# Patient Record
Sex: MALE | Race: WHITE | NOT HISPANIC OR LATINO | URBAN - METROPOLITAN AREA
[De-identification: names, ages, dates, MRNs, and addresses within clinical notes are randomized per-mention and may not be internally consistent; named-entity substitution may affect disease eponyms.]

---
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
## 2024-08-28 ENCOUNTER — OFFICE VISIT (OUTPATIENT)
Dept: URGENT CARE | Facility: PHYSICIAN GROUP | Age: 19
End: 2024-08-28
Payer: COMMERCIAL

## 2024-08-28 VITALS
TEMPERATURE: 97 F | SYSTOLIC BLOOD PRESSURE: 120 MMHG | BODY MASS INDEX: 19.1 KG/M2 | HEART RATE: 63 BPM | WEIGHT: 121.7 LBS | OXYGEN SATURATION: 98 % | DIASTOLIC BLOOD PRESSURE: 84 MMHG | RESPIRATION RATE: 14 BRPM | HEIGHT: 67 IN

## 2024-08-28 DIAGNOSIS — J02.9 PHARYNGITIS, UNSPECIFIED ETIOLOGY: ICD-10-CM

## 2024-08-28 DIAGNOSIS — J06.9 VIRAL UPPER RESPIRATORY TRACT INFECTION: ICD-10-CM

## 2024-08-28 LAB
FLUAV RNA SPEC QL NAA+PROBE: NEGATIVE
FLUBV RNA SPEC QL NAA+PROBE: NEGATIVE
RSV RNA SPEC QL NAA+PROBE: NEGATIVE
S PYO DNA SPEC NAA+PROBE: NOT DETECTED
SARS-COV-2 RNA RESP QL NAA+PROBE: NEGATIVE

## 2024-08-28 RX ORDER — CETIRIZINE HYDROCHLORIDE 10 MG/1
10 TABLET ORAL DAILY
COMMUNITY

## 2024-08-28 RX ORDER — DEXAMETHASONE SODIUM PHOSPHATE 10 MG/ML
10 INJECTION INTRAMUSCULAR; INTRAVENOUS ONCE
Status: COMPLETED | OUTPATIENT
Start: 2024-08-28 | End: 2024-08-28

## 2024-08-28 RX ORDER — LIDOCAINE HYDROCHLORIDE 20 MG/ML
15 SOLUTION OROPHARYNGEAL 3 TIMES DAILY PRN
Qty: 100 ML | Refills: 0 | Status: SHIPPED | OUTPATIENT
Start: 2024-08-28 | End: 2024-09-02

## 2024-08-28 RX ADMIN — DEXAMETHASONE SODIUM PHOSPHATE 10 MG: 10 INJECTION INTRAMUSCULAR; INTRAVENOUS at 17:23

## 2024-08-28 ASSESSMENT — ENCOUNTER SYMPTOMS
SHORTNESS OF BREATH: 0
FEVER: 1
SORE THROAT: 1
STRIDOR: 0
DIARRHEA: 0
ABDOMINAL PAIN: 0
VOMITING: 0
SWOLLEN GLANDS: 0
CHILLS: 1
HEADACHES: 0
NECK PAIN: 1
COUGH: 0
HOARSE VOICE: 1
TROUBLE SWALLOWING: 0

## 2024-08-28 NOTE — LETTER
August 28, 2024    To Whom It May Concern:         This is confirmation that Man CANCINO Olivarez attended his scheduled appointment with ABNER Oconnor on 8/28/24. Please excuse him from work on 8/29/24 - 8/31/24 due to an acute illness.          If you have any questions please do not hesitate to call me at the phone number listed below.    Sincerely,          Danielle Mccauley A.P.R.N.  [phone]

## 2024-08-29 NOTE — PATIENT INSTRUCTIONS
"As we discussed your clinical condition would benefit from over-the-counter remedies:    Congestion:    Nasal rinsing with saline nasal spray or salt water (e.g., neti pot) can help relieve nasal dryness.    Breathe Right nasal strips at night for nasal congestion    Ponaris nasal emmollient for nasal congestion, dryness, and inflammation (do not use with iodine sensitivity)    Cool mist humidification, chest rubs, warm tea with honey, increased fluid intake to thin secretions    SALINE IRRIGATION/SPRAY 2 to 3 times per day    You may consider using a saline nasal irrigation (such as a \"Neti pot\" or similar device using sterile water, NOT tap water) or OTC saline nasal spray. Common brands include Gerald Med, Sinex, [place] Nasal. May use short term nasal sprays, such as oxymetazoline (Afrin) to help relieve nasal discomfort, congestion, and/or pressure. Decongestant sprays should not be used longer than three consecutive days.     Over the counter medications:    OTC second generation antihistamine daily (cetirizine, desloratadine, fexofenadine, levocetirizine, and loratadine) daily IN COMBINATION WITH:    FLONASE (once per day) x 2 weeks     You may consider intranasal steroids such as fluticasone (brand name Flonase), (other options include Nasonex, Rhinocort, Nasacort) daily; continue for at least 2-3 weeks. Any generic should work as well but may cause slightly more nasal irritation. Please take according to package directions.  This steroid will help reduce inflammation of your sinuses.  This is the number one medication to help with seasonal allergies and treat nasal inflammation.  Cost: around $8 at [facility] for the generic fluticasone [facility] product (as of 5/20).    SUDAFED (low dose to see if tolerated) daily x 4-5 days    You may consider over-the-counter Sudafed (pseudoephedrine) to act as a decongestant to improve your breathing through your nose.  Please do not use this medication if you already have known " high blood pressure.  Please take according to package directions and note that this has a stimulating effect and should not be taken late in the day.  There is a low dose 12 hour formulation and a higher dose 24 hour formulation.  Start with a low dose to make sure you tolerate the medication.  Do not take late in the day as it may interfere with sleep.   Cost: Around $6 for the generic [facility] branded product at a 10mg dose (as of 2/2023).      SORE THROAT:    Symptom management for a sore throat includes:     Sipping cold or warm beverages (eg, tea with honey or lemon)     Eating cold or frozen desserts (eg, ice cream, popsicles)    Sucking on ice    Sucking on hard candy, CEPACOL lozenges, or medicated throat sprays. These contain the active ingredient benzocaine which is an anesthetic agent.  It helps relieve the symptoms of sore throat and may diminish your cough reflex.Please note that taking too frequently may cause harm - pay attention to package directions.    Gargling with warm salt water -  ¼ to ½ teaspoon of salt per 8 ounces (approximately 240 mL) of warm water.  Cool mist humidification  OTC Tylenol/ibuprofen PRN for pain/fever     PAIN OR FEVER:    NSAIDs  You may take Ibuprofen (brand name Motrin or Advil) 600 to 800 mg may be taken up to three times per day taken with food (do not exceed 2400 mg per day).  If you prefer you may consider Naproxen (brand name Naprosyn or Aleve) around 500 mg twice per day with food (do not exceed 1200 mg per day). Please do not take if you have known stomach ulcers or known kidney disease.     TYLENOL  You may take Tylenol according to package directions (1000 mg every 8 hours not to exceed 3000 mg per day).  Please do not consume with any alcohol products, or if you have known or suspected liver disease. These will help reduce inflammation, help with pain control, and can reduce fevers.

## 2024-08-29 NOTE — PROGRESS NOTES
Subjective:   Man Olivarez is a very pleasant 19 y.o. male who presents for:    Chief Complaint   Patient presents with    Pharyngitis     Fever, chills x 3 days        HPI:    Pharyngitis   Episode onset: three days ago, the patient developed a sore throat. There has been no fever. The pain is moderate. Associated symptoms include a hoarse voice and neck pain. Pertinent negatives include no abdominal pain, congestion, coughing, diarrhea, drooling, ear discharge, headaches, plugged ear sensation, shortness of breath, stridor, swollen glands, trouble swallowing or vomiting. Associated symptoms comments: Loss of appetite . He has had no exposure to strep or mono. Treatments tried: OTC cold medication. The treatment provided mild relief.       ROS:    Review of Systems   Constitutional:  Positive for chills, fever and malaise/fatigue.   HENT:  Positive for hoarse voice and sore throat. Negative for congestion, drooling, ear discharge and trouble swallowing.    Respiratory:  Negative for cough, shortness of breath and stridor.    Gastrointestinal:  Negative for abdominal pain, diarrhea and vomiting.   Musculoskeletal:  Positive for neck pain.   Neurological:  Negative for headaches.   All other systems reviewed and are negative.      Medications:      Current Outpatient Medications   Medication Sig    cetirizine (ZYRTEC) 10 MG Tab Take 10 mg by mouth every day.    tobramycin (TOBREX) 0.3 % Solution ophthalmic solution Administer 1 Drop into both eyes every 4 hours. While awake for 5-7 days (Patient not taking: Reported on 8/28/2024)       Allergies:     No Known Allergies    Problem List:     Patient Active Problem List   Diagnosis    Has poorly balanced diet    Chest pain    Intermittent headache       Surgical History:    No past surgical history on file.    Past Social Hx:     Social History     Socioeconomic History    Marital status: Single   Tobacco Use    Smoking status: Never    Smokeless tobacco: Never    Vaping Use    Vaping status: Former    Quit date: 12/19/2021   Substance and Sexual Activity    Alcohol use: No    Drug use: Yes     Frequency: 3.0 times per week     Types: Marijuana, Inhaled    Sexual activity: Never     Birth control/protection: Abstinence        Past Family Hx:      Family History   Problem Relation Age of Onset    Hypertension Maternal Grandmother     Alcohol/Drug Maternal Grandfather     Psychiatric Illness Maternal Grandfather         depression    Arthritis Neg Hx     Lung Disease Neg Hx     Genetic Disorder Neg Hx     Cancer Neg Hx     Diabetes Neg Hx     Heart Disease Neg Hx     Hyperlipidemia Neg Hx     Stroke Neg Hx        Problem list, medications, and allergies reviewed by myself today in Epic.     Objective:     Vitals:    08/28/24 1702   BP: 120/84   Pulse: 63   Resp: 14   Temp: 36.1 °C (97 °F)   SpO2: 98%       Physical Exam  Vitals reviewed.   Constitutional:       General: He is not in acute distress.     Appearance: Normal appearance. He is not ill-appearing or toxic-appearing.   HENT:      Head: Normocephalic and atraumatic.      Right Ear: Tympanic membrane, ear canal and external ear normal. No middle ear effusion. No mastoid tenderness. Tympanic membrane is not injected or bulging.      Left Ear: Tympanic membrane, ear canal and external ear normal.  No middle ear effusion. No mastoid tenderness. Tympanic membrane is not injected or bulging.      Nose: Congestion present. No rhinorrhea.      Mouth/Throat:      Lips: Pink.      Mouth: Mucous membranes are moist.      Dentition: Normal dentition.      Tongue: No lesions. Tongue does not deviate from midline.      Palate: No mass and lesions.      Pharynx: Oropharynx is clear. Uvula midline. Posterior oropharyngeal erythema present. No pharyngeal swelling, oropharyngeal exudate or uvula swelling.      Tonsils: No tonsillar exudate or tonsillar abscesses.   Eyes:      Extraocular Movements: Extraocular movements intact.       Conjunctiva/sclera: Conjunctivae normal.      Pupils: Pupils are equal, round, and reactive to light.   Neck:      Trachea: Trachea and phonation normal.   Cardiovascular:      Rate and Rhythm: Normal rate and regular rhythm.   Pulmonary:      Effort: Pulmonary effort is normal.      Breath sounds: Normal breath sounds.   Abdominal:      Palpations: Abdomen is soft.   Musculoskeletal:         General: Normal range of motion.      Cervical back: Full passive range of motion without pain, normal range of motion and neck supple.   Lymphadenopathy:      Cervical: No cervical adenopathy.   Skin:     General: Skin is warm and dry.   Neurological:      General: No focal deficit present.      Mental Status: He is alert and oriented to person, place, and time. Mental status is at baseline.       Results from POCT:   Results for orders placed or performed in visit on 08/28/24   POCT CoV-2, Flu A/B, RSV by PCR   Result Value Ref Range    SARS-CoV-2 by PCR Negative Negative, Invalid    Influenza virus A RNA Negative Negative, Invalid    Influenza virus B, PCR Negative Negative, Invalid    RSV, PCR Negative Negative, Invalid   POCT CEPHEID GROUP A STREP - PCR   Result Value Ref Range    POC Group A Strep, PCR Not Detected Not Detected, Invalid       Assessment/Plan:     Diagnosis and associated orders:     1. Pharyngitis, unspecified etiology  - POCT CEPHEID GROUP A STREP - PCR  - lidocaine (XYLOCAINE) 2 % Solution; Take 15 mL by mouth 3 times a day as needed for Throat/Mouth Pain for up to 5 days. Swish and spit. DO NOT SWALLOW.  Dispense: 100 mL; Refill: 0  - dexamethasone (Decadron) injection (check route below) 10 mg    2. Viral upper respiratory tract infection  - POCT CoV-2, Flu A/B, RSV by PCR    Other orders  - cetirizine (ZYRTEC) 10 MG Tab; Take 10 mg by mouth every day.          Comments/MDM:     POCT strep, COVID, influenza, RSV NEGATIVE   The patient is well-appearing in clinic and in no acute distress  Per the Toast  trial I will administer 10 mg of dexamethasone in clinic for pharyngitis.  I discussed the risks vs benefits as well as alternatives with the patient and using shared  decision making we have elected this as a treatment modality.     We discussed a throat culture but due to short duration of symptoms and the fact it is unlikely to  we will hold off for now.  If the patient continues to have symptoms I advised to return.  The natural history of mono would suggest that the patient has not developed antibodies and so I will not do a mono spot at this time.  If the patient continues to have pharyngitis and constitutional symptoms I instructed them to return 2 weeks for Monospot.    Supportive care discussed:  -Maintain hydration/water intake  -Nasal saline rinses 2-3 times a day   -May use short term nasal sprays, such as oxymetazoline (Afrin) to help relieve nasal discomfort, congestion, and/or pressure. Decongestant sprays should not be used longer than three consecutive days.   -Nasal rinsing with saline nasal spray or salt water (e.g., neti pot) can help relieve nasal dryness.  -Breathe Right nasal strips at night for nasal congestion  -Ponaris nasal emmollient for nasal congestion, dryness, and inflammation (do not use with iodine sensitivity)  -Cool mist humidification, chest rubs, warm tea with honey, increased fluid intake to thin secretions  -May gargle with salt water up to 4x/day as needed for throat discomfort (1 tsp salt dissolved in 1 cup warm water)  -Over-the-counter throat sprays, rest, hydration with frozen (eg, ice or popsicles) or warmed liquids, herbal tea containing licorice root, elm inner bark, marshmallow root, and licorice root aqueous dry extract, Cepacol lozenges, soft diet, honey, vitamin C, zinc lozenges, and elderberry supplements.  I considered other causes of pharyngitis including Group C, G strep, peritonsillar abscess, xiomy's angina, and retropharyngeal abscess but  the patient's reported symptoms and my exam do not support these alternative diagnosis based on information I have available today.  This may change and I encouraged the patient to return to clinic if they are experiencing new symptoms or their symptoms fail to resolve with time as we cannot rule out all pathology from a single Urgent Care visit.   Return to clinic or go to the ED if symptoms worsen or fail to improve, or if patient should develop worsening/increasing sore throat, difficulty swallowing, drooling, change in voice, swollen glands, shortness of breath, ear pain, cough, congestion, fever/chills, and/or any concerning symptoms.           All questions answered. Patient verbalized understanding and is in agreement with this plan of care.     If symptoms are worsening or not improving in 3-5 days, follow-up with PCP or return to UC. Differential diagnosis, natural history, and supportive care discussed. AVS handout given and reviewed with patient. Patient educated on red flags and when to seek treatment back in ED or UC.     I personally reviewed prior external notes and test results pertinent to today's visit.  I have independently reviewed and interpreted all diagnostics ordered during this urgent care visit.     This dictation has been created using voice recognition software. The accuracy of the dictation is limited by the abilities of the software. I expect there may be some errors of grammar and possibly content. I made every attempt to manually correct the errors within my dictation. However, errors related to voice recognition software may still exist and should be interpreted within the appropriate context.    This note was electronically signed by ABNER Peoples

## 2025-01-18 ENCOUNTER — OFFICE VISIT (OUTPATIENT)
Dept: URGENT CARE | Facility: PHYSICIAN GROUP | Age: 20
End: 2025-01-18
Payer: COMMERCIAL

## 2025-01-18 VITALS
DIASTOLIC BLOOD PRESSURE: 60 MMHG | HEIGHT: 67 IN | OXYGEN SATURATION: 95 % | BODY MASS INDEX: 20.15 KG/M2 | HEART RATE: 100 BPM | TEMPERATURE: 99.3 F | WEIGHT: 128.4 LBS | SYSTOLIC BLOOD PRESSURE: 130 MMHG | RESPIRATION RATE: 12 BRPM

## 2025-01-18 DIAGNOSIS — B34.9 VIRAL ILLNESS: ICD-10-CM

## 2025-01-18 DIAGNOSIS — R11.0 NAUSEA: ICD-10-CM

## 2025-01-18 DIAGNOSIS — R50.9 FEVER IN ADULT: ICD-10-CM

## 2025-01-18 PROCEDURE — 87651 STREP A DNA AMP PROBE: CPT | Performed by: NURSE PRACTITIONER

## 2025-01-18 PROCEDURE — 99214 OFFICE O/P EST MOD 30 MIN: CPT | Performed by: NURSE PRACTITIONER

## 2025-01-18 PROCEDURE — 0241U POCT CEPHEID COV-2, FLU A/B, RSV - PCR: CPT | Performed by: NURSE PRACTITIONER

## 2025-01-18 PROCEDURE — 3078F DIAST BP <80 MM HG: CPT | Performed by: NURSE PRACTITIONER

## 2025-01-18 PROCEDURE — 3075F SYST BP GE 130 - 139MM HG: CPT | Performed by: NURSE PRACTITIONER

## 2025-01-18 RX ORDER — ONDANSETRON 4 MG/1
4 TABLET, ORALLY DISINTEGRATING ORAL EVERY 6 HOURS PRN
Qty: 10 TABLET | Refills: 0 | Status: SHIPPED | OUTPATIENT
Start: 2025-01-18

## 2025-01-18 NOTE — PROGRESS NOTES
Chief Complaint   Patient presents with    Emesis     Twice today, swollen neck, headache, body ache, fever, hot and cold, x3 days, pt wants doc note for work from yesterday and today.       HISTORY OF PRESENT ILLNESS: Patient is a pleasant 19 y.o. male who presents today due to symptoms which started four days with sudden onset. Pt reports a fever, chills, body aches. Reports associated sore throat, vomiting, loss of appetite, headache, and fatigue. Denies blood in sputum, chest pain, shortness of breath, wheezing.  Denies h/o asthma/copd/CAP. No immunocompromise. Has tried OTC cold/flu medications without significant relief of symptoms. No recent ABX use. No other aggravating or alleviating factors.  His girlfriend was ill with similar symptoms, she has since improved without any medication interventions.      Patient Active Problem List    Diagnosis Date Noted    Has poorly balanced diet 01/04/2018    Chest pain 01/04/2018    Intermittent headache 01/04/2018       Allergies:Patient has no known allergies.    Current Outpatient Medications Ordered in Epic   Medication Sig Dispense Refill    cetirizine (ZYRTEC) 10 MG Tab Take 10 mg by mouth every day.       No current [facility]-ordered facility-administered medications on file.       Past Medical History:   Diagnosis Date    Healthy child on routine physical examination        Social History     Tobacco Use    Smoking status: Never     Passive exposure: Never    Smokeless tobacco: Never   Vaping Use    Vaping status: Some Days    Last attempt to quit: 12/19/2021    Substances: Nicotine, THC    Devices: Disposable   Substance Use Topics    Alcohol use: Not Currently    Drug use: Yes     Frequency: 3.0 times per week     Types: Marijuana, Inhaled       Family Status   Relation Name Status    Mo  Alive    Fa  Alive    MGMo  (Not Specified)    MGFa  (Not Specified)    Neg Hx  (Not Specified)   No partnership data on file     Family History   Problem Relation Age of Onset  "   Hypertension Maternal Grandmother     Alcohol/Drug Maternal Grandfather     Psychiatric Illness Maternal Grandfather         depression    Arthritis Neg Hx     Lung Disease Neg Hx     Genetic Disorder Neg Hx     Cancer Neg Hx     Diabetes Neg Hx     Heart Disease Neg Hx     Hyperlipidemia Neg Hx     Stroke Neg Hx        ROS:  Review of Systems   Constitutional: Positive for subjective fever, chills, fatigue, malaise. Negative for weight loss.  HENT: Positive for sore throat. Negative for ear pain, nosebleeds, and neck pain.    Eyes: Negative for vision changes.   Cardiovascular: Negative for chest pain, palpitations, orthopnea and leg swelling.   Respiratory: Negative for cough, shortness of breath and wheezing.   Gastrointestinal: Positive for nausea, vomiting.  Negative for abdominal pain, diarrhea.   Skin: Negative for rash, diaphoresis.     Exam:  /60 (BP Location: Right arm, Patient Position: Sitting, BP Cuff Size: Adult)   Pulse 100   Temp 37.4 °C (99.3 °F)   Resp 12   Ht 1.689 m (5' 6.5\")   Wt 58.2 kg (128 lb 6.4 oz)   SpO2 95%   General: well-nourished, well-developed male, appears uncomfortable, non-toxic in appearance.   Head: normocephalic, atraumatic.  Eyes: PERRLA, EOM within normal limits, no conjunctival injection, no scleral icterus, visual fields and acuity grossly intact.  Ears: normal shape and symmetry, no tenderness, no discharge. External canals are without any significant edema or erythema. Tympanic membranes are without any inflammation, no effusion. Gross auditory acuity is intact.  Nose: symmetrical without tenderness, mild discharge, erythema present bilateral nares.  Mouth/Throat: reasonable hygiene, no exudates or tonsillar enlargement. Erythema present.   Neck: no masses, range of motion within normal limits, no tracheal deviation.  Lymph: mild cervical adenopathy. No supraclavicular adenopathy.   Neuro: alert and oriented. Cranial nerves 1-12 grossly intact. "   Cardiovascular: Regular rate and regular rhythm without murmurs, rubs, or gallops. No edema.   Pulmonary: no distress. Chest is symmetrical with respiration, no wheezes, crackles, or rhonchi.   GI: Soft, nontender.  Musculoskeletal: appropriate muscle tone, gait is stable.  Skin: warm, dry, intact, no clubbing, no cyanosis.   Psych: appropriate mood, affect, judgement.         Lab Results/POC Test Results   Results for orders placed or performed in visit on 01/18/25   POCT GROUP A STREP, PCR    Collection Time: 01/18/25  3:31 PM   Result Value Ref Range    POC Group A Strep, PCR Not Detected Not Detected, Invalid   POCT CoV-2, Flu A/B, RSV by PCR    Collection Time: 01/18/25  3:31 PM   Result Value Ref Range    SARS-CoV-2 by PCR Negative Negative, Invalid    Influenza virus A RNA Negative Negative, Invalid    Influenza virus B, PCR Negative Negative, Invalid    RSV, PCR Negative Negative, Invalid               Assessment/Plan:  1. Viral illness        2. Fever in adult  POCT GROUP A STREP, PCR    POCT CoV-2, Flu A/B, RSV by PCR      3. Nausea  ondansetron (ZOFRAN ODT) 4 MG TABLET DISPERSIBLE                Discussed symptoms most likely viral, self limiting illness. Did not see any evidence of a bacterial process. Discussed natural progression and sx care. Rest, increase fluid intake, hand and respiratory hygiene. OTC cough/cold medications as directed. Zofran provided.   Supportive care, differential diagnoses, and indications for immediate follow-up discussed with patient.   Pathogenesis of diagnosis discussed including typical length and natural progression.   Instructed to return to clinic or nearest emergency department for any change in condition, further concerns, or worsening of symptoms.  Patient states understanding of the plan of care and discharge instructions.  Instructed to make an appointment, for follow up, with his primary care provider.            Please note that this dictation was created using  voice recognition software. I have made every reasonable attempt to correct obvious errors, but I expect that there are errors of grammar and possibly content that I did not discover before finalizing the note.       LISSETH Waite.

## 2025-01-18 NOTE — LETTER
January 18, 2025         Patient: Man Olivarez   YOB: 2005   Date of Visit: 1/18/2025           To Whom it May Concern:    Man Olivarez was seen in my clinic on 1/18/2025. He may be excused from work yesterday and today.  If you have any questions or concerns, please don't hesitate to call.        Sincerely,           LISSETH Waite.  Electronically Signed